# Patient Record
Sex: FEMALE | Race: WHITE | NOT HISPANIC OR LATINO | URBAN - METROPOLITAN AREA
[De-identification: names, ages, dates, MRNs, and addresses within clinical notes are randomized per-mention and may not be internally consistent; named-entity substitution may affect disease eponyms.]

---

## 2018-10-31 ENCOUNTER — TRANSCRIBE ORDERS (OUTPATIENT)
Dept: LAB | Facility: CLINIC | Age: 73
End: 2018-10-31

## 2018-10-31 ENCOUNTER — APPOINTMENT (OUTPATIENT)
Dept: LAB | Facility: CLINIC | Age: 73
End: 2018-10-31
Payer: MEDICARE

## 2018-10-31 DIAGNOSIS — Z11.59 SCREENING EXAMINATION FOR POLIOMYELITIS: ICD-10-CM

## 2018-10-31 DIAGNOSIS — E78.00 PURE HYPERCHOLESTEROLEMIA: Primary | ICD-10-CM

## 2018-10-31 DIAGNOSIS — E78.00 PURE HYPERCHOLESTEROLEMIA: ICD-10-CM

## 2018-10-31 LAB
ALBUMIN SERPL BCP-MCNC: 4.1 G/DL (ref 3.5–5)
ALP SERPL-CCNC: 102 U/L (ref 46–116)
ALT SERPL W P-5'-P-CCNC: 45 U/L (ref 12–78)
ANION GAP SERPL CALCULATED.3IONS-SCNC: 5 MMOL/L (ref 4–13)
AST SERPL W P-5'-P-CCNC: 38 U/L (ref 5–45)
BASOPHILS # BLD AUTO: 0.05 THOUSANDS/ΜL (ref 0–0.1)
BASOPHILS NFR BLD AUTO: 1 % (ref 0–1)
BILIRUB SERPL-MCNC: 0.78 MG/DL (ref 0.2–1)
BUN SERPL-MCNC: 13 MG/DL (ref 5–25)
CALCIUM SERPL-MCNC: 9.5 MG/DL (ref 8.3–10.1)
CHLORIDE SERPL-SCNC: 100 MMOL/L (ref 100–108)
CHOLEST SERPL-MCNC: 215 MG/DL (ref 50–200)
CO2 SERPL-SCNC: 29 MMOL/L (ref 21–32)
CREAT SERPL-MCNC: 1.02 MG/DL (ref 0.6–1.3)
EOSINOPHIL # BLD AUTO: 0.17 THOUSAND/ΜL (ref 0–0.61)
EOSINOPHIL NFR BLD AUTO: 3 % (ref 0–6)
ERYTHROCYTE [DISTWIDTH] IN BLOOD BY AUTOMATED COUNT: 12.5 % (ref 11.6–15.1)
GFR SERPL CREATININE-BSD FRML MDRD: 55 ML/MIN/1.73SQ M
GLUCOSE P FAST SERPL-MCNC: 80 MG/DL (ref 65–99)
HCT VFR BLD AUTO: 44 % (ref 34.8–46.1)
HDLC SERPL-MCNC: 60 MG/DL (ref 40–60)
HGB BLD-MCNC: 14.3 G/DL (ref 11.5–15.4)
IMM GRANULOCYTES # BLD AUTO: 0.02 THOUSAND/UL (ref 0–0.2)
IMM GRANULOCYTES NFR BLD AUTO: 0 % (ref 0–2)
LDLC SERPL CALC-MCNC: 134 MG/DL (ref 0–100)
LYMPHOCYTES # BLD AUTO: 2.18 THOUSANDS/ΜL (ref 0.6–4.47)
LYMPHOCYTES NFR BLD AUTO: 33 % (ref 14–44)
MCH RBC QN AUTO: 33.1 PG (ref 26.8–34.3)
MCHC RBC AUTO-ENTMCNC: 32.5 G/DL (ref 31.4–37.4)
MCV RBC AUTO: 102 FL (ref 82–98)
MONOCYTES # BLD AUTO: 0.7 THOUSAND/ΜL (ref 0.17–1.22)
MONOCYTES NFR BLD AUTO: 11 % (ref 4–12)
NEUTROPHILS # BLD AUTO: 3.43 THOUSANDS/ΜL (ref 1.85–7.62)
NEUTS SEG NFR BLD AUTO: 52 % (ref 43–75)
NONHDLC SERPL-MCNC: 155 MG/DL
NRBC BLD AUTO-RTO: 0 /100 WBCS
PLATELET # BLD AUTO: 299 THOUSANDS/UL (ref 149–390)
PMV BLD AUTO: 11.1 FL (ref 8.9–12.7)
POTASSIUM SERPL-SCNC: 4.4 MMOL/L (ref 3.5–5.3)
PROT SERPL-MCNC: 8 G/DL (ref 6.4–8.2)
RBC # BLD AUTO: 4.32 MILLION/UL (ref 3.81–5.12)
SODIUM SERPL-SCNC: 134 MMOL/L (ref 136–145)
TRIGL SERPL-MCNC: 104 MG/DL
TSH SERPL DL<=0.05 MIU/L-ACNC: 0.91 UIU/ML (ref 0.36–3.74)
WBC # BLD AUTO: 6.55 THOUSAND/UL (ref 4.31–10.16)

## 2018-10-31 PROCEDURE — 84443 ASSAY THYROID STIM HORMONE: CPT

## 2018-10-31 PROCEDURE — 85025 COMPLETE CBC W/AUTO DIFF WBC: CPT

## 2018-10-31 PROCEDURE — 36415 COLL VENOUS BLD VENIPUNCTURE: CPT

## 2018-10-31 PROCEDURE — 80053 COMPREHEN METABOLIC PANEL: CPT

## 2018-10-31 PROCEDURE — 86803 HEPATITIS C AB TEST: CPT

## 2018-10-31 PROCEDURE — 80061 LIPID PANEL: CPT

## 2018-11-01 LAB — HCV AB SER QL: NORMAL

## 2019-05-17 ENCOUNTER — APPOINTMENT (OUTPATIENT)
Dept: LAB | Facility: CLINIC | Age: 74
End: 2019-05-17
Payer: MEDICARE

## 2019-05-17 ENCOUNTER — TRANSCRIBE ORDERS (OUTPATIENT)
Dept: LAB | Facility: CLINIC | Age: 74
End: 2019-05-17

## 2019-05-17 DIAGNOSIS — E78.00 PURE HYPERCHOLESTEROLEMIA: Primary | ICD-10-CM

## 2019-05-17 LAB
ALBUMIN SERPL BCP-MCNC: 4.2 G/DL (ref 3.5–5)
ALP SERPL-CCNC: 103 U/L (ref 46–116)
ALT SERPL W P-5'-P-CCNC: 31 U/L (ref 12–78)
ANION GAP SERPL CALCULATED.3IONS-SCNC: 5 MMOL/L (ref 4–13)
AST SERPL W P-5'-P-CCNC: 25 U/L (ref 5–45)
BASOPHILS # BLD AUTO: 0.04 THOUSANDS/ΜL (ref 0–0.1)
BASOPHILS NFR BLD AUTO: 1 % (ref 0–1)
BILIRUB SERPL-MCNC: 0.87 MG/DL (ref 0.2–1)
BUN SERPL-MCNC: 12 MG/DL (ref 5–25)
CALCIUM SERPL-MCNC: 10.1 MG/DL (ref 8.3–10.1)
CHLORIDE SERPL-SCNC: 101 MMOL/L (ref 100–108)
CHOLEST SERPL-MCNC: 218 MG/DL (ref 50–200)
CO2 SERPL-SCNC: 29 MMOL/L (ref 21–32)
CREAT SERPL-MCNC: 1.01 MG/DL (ref 0.6–1.3)
EOSINOPHIL # BLD AUTO: 0.12 THOUSAND/ΜL (ref 0–0.61)
EOSINOPHIL NFR BLD AUTO: 1 % (ref 0–6)
ERYTHROCYTE [DISTWIDTH] IN BLOOD BY AUTOMATED COUNT: 13.1 % (ref 11.6–15.1)
GFR SERPL CREATININE-BSD FRML MDRD: 56 ML/MIN/1.73SQ M
GLUCOSE P FAST SERPL-MCNC: 85 MG/DL (ref 65–99)
HCT VFR BLD AUTO: 43 % (ref 34.8–46.1)
HDLC SERPL-MCNC: 71 MG/DL (ref 40–60)
HGB BLD-MCNC: 13.9 G/DL (ref 11.5–15.4)
IMM GRANULOCYTES # BLD AUTO: 0.02 THOUSAND/UL (ref 0–0.2)
IMM GRANULOCYTES NFR BLD AUTO: 0 % (ref 0–2)
LDLC SERPL CALC-MCNC: 133 MG/DL (ref 0–100)
LYMPHOCYTES # BLD AUTO: 1.67 THOUSANDS/ΜL (ref 0.6–4.47)
LYMPHOCYTES NFR BLD AUTO: 20 % (ref 14–44)
MCH RBC QN AUTO: 33.5 PG (ref 26.8–34.3)
MCHC RBC AUTO-ENTMCNC: 32.3 G/DL (ref 31.4–37.4)
MCV RBC AUTO: 104 FL (ref 82–98)
MONOCYTES # BLD AUTO: 0.65 THOUSAND/ΜL (ref 0.17–1.22)
MONOCYTES NFR BLD AUTO: 8 % (ref 4–12)
NEUTROPHILS # BLD AUTO: 5.95 THOUSANDS/ΜL (ref 1.85–7.62)
NEUTS SEG NFR BLD AUTO: 70 % (ref 43–75)
NONHDLC SERPL-MCNC: 147 MG/DL
NRBC BLD AUTO-RTO: 0 /100 WBCS
PLATELET # BLD AUTO: 287 THOUSANDS/UL (ref 149–390)
PMV BLD AUTO: 10.9 FL (ref 8.9–12.7)
POTASSIUM SERPL-SCNC: 4.3 MMOL/L (ref 3.5–5.3)
PROT SERPL-MCNC: 7.9 G/DL (ref 6.4–8.2)
RBC # BLD AUTO: 4.15 MILLION/UL (ref 3.81–5.12)
SODIUM SERPL-SCNC: 135 MMOL/L (ref 136–145)
TRIGL SERPL-MCNC: 71 MG/DL
TSH SERPL DL<=0.05 MIU/L-ACNC: 0.8 UIU/ML (ref 0.36–3.74)
WBC # BLD AUTO: 8.45 THOUSAND/UL (ref 4.31–10.16)

## 2019-05-17 PROCEDURE — 85025 COMPLETE CBC W/AUTO DIFF WBC: CPT

## 2019-05-17 PROCEDURE — 80053 COMPREHEN METABOLIC PANEL: CPT

## 2019-05-17 PROCEDURE — 84443 ASSAY THYROID STIM HORMONE: CPT

## 2019-05-17 PROCEDURE — 80061 LIPID PANEL: CPT

## 2019-05-17 PROCEDURE — 36415 COLL VENOUS BLD VENIPUNCTURE: CPT

## 2019-11-15 ENCOUNTER — TRANSCRIBE ORDERS (OUTPATIENT)
Dept: LAB | Facility: CLINIC | Age: 74
End: 2019-11-15

## 2019-11-15 ENCOUNTER — APPOINTMENT (OUTPATIENT)
Dept: LAB | Facility: CLINIC | Age: 74
End: 2019-11-15
Payer: MEDICARE

## 2019-11-15 DIAGNOSIS — I10 ESSENTIAL HYPERTENSION, MALIGNANT: Primary | ICD-10-CM

## 2019-11-15 DIAGNOSIS — I10 ESSENTIAL HYPERTENSION, MALIGNANT: ICD-10-CM

## 2019-11-15 LAB
ALBUMIN SERPL BCP-MCNC: 4.2 G/DL (ref 3.5–5)
ALP SERPL-CCNC: 101 U/L (ref 46–116)
ALT SERPL W P-5'-P-CCNC: 32 U/L (ref 12–78)
ANION GAP SERPL CALCULATED.3IONS-SCNC: 8 MMOL/L (ref 4–13)
AST SERPL W P-5'-P-CCNC: 22 U/L (ref 5–45)
BASOPHILS # BLD AUTO: 0.04 THOUSANDS/ΜL (ref 0–0.1)
BASOPHILS NFR BLD AUTO: 1 % (ref 0–1)
BILIRUB SERPL-MCNC: 0.8 MG/DL (ref 0.2–1)
BUN SERPL-MCNC: 11 MG/DL (ref 5–25)
CALCIUM SERPL-MCNC: 9.4 MG/DL (ref 8.3–10.1)
CHLORIDE SERPL-SCNC: 104 MMOL/L (ref 100–108)
CHOLEST SERPL-MCNC: 227 MG/DL (ref 50–200)
CO2 SERPL-SCNC: 26 MMOL/L (ref 21–32)
CREAT SERPL-MCNC: 0.96 MG/DL (ref 0.6–1.3)
EOSINOPHIL # BLD AUTO: 0.14 THOUSAND/ΜL (ref 0–0.61)
EOSINOPHIL NFR BLD AUTO: 2 % (ref 0–6)
ERYTHROCYTE [DISTWIDTH] IN BLOOD BY AUTOMATED COUNT: 13.2 % (ref 11.6–15.1)
GFR SERPL CREATININE-BSD FRML MDRD: 59 ML/MIN/1.73SQ M
GLUCOSE P FAST SERPL-MCNC: 81 MG/DL (ref 65–99)
HCT VFR BLD AUTO: 43.7 % (ref 34.8–46.1)
HDLC SERPL-MCNC: 71 MG/DL
HGB BLD-MCNC: 14 G/DL (ref 11.5–15.4)
IMM GRANULOCYTES # BLD AUTO: 0.01 THOUSAND/UL (ref 0–0.2)
IMM GRANULOCYTES NFR BLD AUTO: 0 % (ref 0–2)
LDLC SERPL CALC-MCNC: 139 MG/DL (ref 0–100)
LYMPHOCYTES # BLD AUTO: 2.02 THOUSANDS/ΜL (ref 0.6–4.47)
LYMPHOCYTES NFR BLD AUTO: 32 % (ref 14–44)
MCH RBC QN AUTO: 33.4 PG (ref 26.8–34.3)
MCHC RBC AUTO-ENTMCNC: 32 G/DL (ref 31.4–37.4)
MCV RBC AUTO: 104 FL (ref 82–98)
MONOCYTES # BLD AUTO: 0.58 THOUSAND/ΜL (ref 0.17–1.22)
MONOCYTES NFR BLD AUTO: 9 % (ref 4–12)
NEUTROPHILS # BLD AUTO: 3.49 THOUSANDS/ΜL (ref 1.85–7.62)
NEUTS SEG NFR BLD AUTO: 56 % (ref 43–75)
NONHDLC SERPL-MCNC: 156 MG/DL
NRBC BLD AUTO-RTO: 0 /100 WBCS
PLATELET # BLD AUTO: 274 THOUSANDS/UL (ref 149–390)
PMV BLD AUTO: 10.8 FL (ref 8.9–12.7)
POTASSIUM SERPL-SCNC: 4.3 MMOL/L (ref 3.5–5.3)
PROT SERPL-MCNC: 7.8 G/DL (ref 6.4–8.2)
RBC # BLD AUTO: 4.19 MILLION/UL (ref 3.81–5.12)
SODIUM SERPL-SCNC: 138 MMOL/L (ref 136–145)
TRIGL SERPL-MCNC: 86 MG/DL
WBC # BLD AUTO: 6.28 THOUSAND/UL (ref 4.31–10.16)

## 2019-11-15 PROCEDURE — 36415 COLL VENOUS BLD VENIPUNCTURE: CPT

## 2019-11-15 PROCEDURE — 80061 LIPID PANEL: CPT

## 2019-11-15 PROCEDURE — 80053 COMPREHEN METABOLIC PANEL: CPT

## 2019-11-15 PROCEDURE — 85025 COMPLETE CBC W/AUTO DIFF WBC: CPT

## 2020-10-21 ENCOUNTER — TRANSCRIBE ORDERS (OUTPATIENT)
Dept: LAB | Facility: CLINIC | Age: 75
End: 2020-10-21

## 2020-10-21 ENCOUNTER — LAB (OUTPATIENT)
Dept: LAB | Facility: CLINIC | Age: 75
End: 2020-10-21
Payer: MEDICARE

## 2020-10-21 DIAGNOSIS — I10 ESSENTIAL HYPERTENSION, MALIGNANT: Primary | ICD-10-CM

## 2020-10-21 DIAGNOSIS — I10 ESSENTIAL HYPERTENSION, MALIGNANT: ICD-10-CM

## 2020-10-21 LAB
ALBUMIN SERPL BCP-MCNC: 4.3 G/DL (ref 3.5–5)
ALP SERPL-CCNC: 111 U/L (ref 46–116)
ALT SERPL W P-5'-P-CCNC: 45 U/L (ref 12–78)
ANION GAP SERPL CALCULATED.3IONS-SCNC: 5 MMOL/L (ref 4–13)
AST SERPL W P-5'-P-CCNC: 29 U/L (ref 5–45)
BASOPHILS # BLD AUTO: 0.05 THOUSANDS/ΜL (ref 0–0.1)
BASOPHILS NFR BLD AUTO: 1 % (ref 0–1)
BILIRUB SERPL-MCNC: 0.76 MG/DL (ref 0.2–1)
BUN SERPL-MCNC: 16 MG/DL (ref 5–25)
CALCIUM SERPL-MCNC: 8.9 MG/DL (ref 8.3–10.1)
CHLORIDE SERPL-SCNC: 104 MMOL/L (ref 100–108)
CHOLEST SERPL-MCNC: 259 MG/DL (ref 50–200)
CO2 SERPL-SCNC: 29 MMOL/L (ref 21–32)
CREAT SERPL-MCNC: 0.88 MG/DL (ref 0.6–1.3)
EOSINOPHIL # BLD AUTO: 0.15 THOUSAND/ΜL (ref 0–0.61)
EOSINOPHIL NFR BLD AUTO: 2 % (ref 0–6)
ERYTHROCYTE [DISTWIDTH] IN BLOOD BY AUTOMATED COUNT: 14 % (ref 11.6–15.1)
GFR SERPL CREATININE-BSD FRML MDRD: 65 ML/MIN/1.73SQ M
GLUCOSE P FAST SERPL-MCNC: 87 MG/DL (ref 65–99)
HCT VFR BLD AUTO: 41.9 % (ref 34.8–46.1)
HDLC SERPL-MCNC: 73 MG/DL
HGB BLD-MCNC: 13 G/DL (ref 11.5–15.4)
IMM GRANULOCYTES # BLD AUTO: 0.02 THOUSAND/UL (ref 0–0.2)
IMM GRANULOCYTES NFR BLD AUTO: 0 % (ref 0–2)
LDLC SERPL CALC-MCNC: 168 MG/DL (ref 0–100)
LYMPHOCYTES # BLD AUTO: 1.66 THOUSANDS/ΜL (ref 0.6–4.47)
LYMPHOCYTES NFR BLD AUTO: 26 % (ref 14–44)
MCH RBC QN AUTO: 32.5 PG (ref 26.8–34.3)
MCHC RBC AUTO-ENTMCNC: 31 G/DL (ref 31.4–37.4)
MCV RBC AUTO: 105 FL (ref 82–98)
MONOCYTES # BLD AUTO: 0.65 THOUSAND/ΜL (ref 0.17–1.22)
MONOCYTES NFR BLD AUTO: 10 % (ref 4–12)
NEUTROPHILS # BLD AUTO: 3.98 THOUSANDS/ΜL (ref 1.85–7.62)
NEUTS SEG NFR BLD AUTO: 61 % (ref 43–75)
NONHDLC SERPL-MCNC: 186 MG/DL
NRBC BLD AUTO-RTO: 0 /100 WBCS
PLATELET # BLD AUTO: 298 THOUSANDS/UL (ref 149–390)
PMV BLD AUTO: 10.8 FL (ref 8.9–12.7)
POTASSIUM SERPL-SCNC: 4.5 MMOL/L (ref 3.5–5.3)
PROT SERPL-MCNC: 7.9 G/DL (ref 6.4–8.2)
RBC # BLD AUTO: 4 MILLION/UL (ref 3.81–5.12)
SODIUM SERPL-SCNC: 138 MMOL/L (ref 136–145)
TRIGL SERPL-MCNC: 89 MG/DL
WBC # BLD AUTO: 6.51 THOUSAND/UL (ref 4.31–10.16)

## 2020-10-21 PROCEDURE — 80061 LIPID PANEL: CPT

## 2020-10-21 PROCEDURE — 80053 COMPREHEN METABOLIC PANEL: CPT

## 2020-10-21 PROCEDURE — 85025 COMPLETE CBC W/AUTO DIFF WBC: CPT

## 2020-10-21 PROCEDURE — 36415 COLL VENOUS BLD VENIPUNCTURE: CPT

## 2021-04-06 ENCOUNTER — TRANSCRIBE ORDERS (OUTPATIENT)
Dept: LAB | Facility: CLINIC | Age: 76
End: 2021-04-06

## 2021-04-06 ENCOUNTER — APPOINTMENT (OUTPATIENT)
Dept: LAB | Facility: CLINIC | Age: 76
End: 2021-04-06
Payer: MEDICARE

## 2021-04-06 DIAGNOSIS — I10 ESSENTIAL HYPERTENSION, MALIGNANT: ICD-10-CM

## 2021-04-06 DIAGNOSIS — E78.2 MIXED HYPERLIPIDEMIA: ICD-10-CM

## 2021-04-06 DIAGNOSIS — E78.2 MIXED HYPERLIPIDEMIA: Primary | ICD-10-CM

## 2021-04-06 LAB
ALBUMIN SERPL BCP-MCNC: 4.2 G/DL (ref 3.5–5)
ALP SERPL-CCNC: 104 U/L (ref 46–116)
ALT SERPL W P-5'-P-CCNC: 45 U/L (ref 12–78)
ANION GAP SERPL CALCULATED.3IONS-SCNC: 3 MMOL/L (ref 4–13)
AST SERPL W P-5'-P-CCNC: 30 U/L (ref 5–45)
BILIRUB SERPL-MCNC: 0.74 MG/DL (ref 0.2–1)
BUN SERPL-MCNC: 12 MG/DL (ref 5–25)
CALCIUM SERPL-MCNC: 9.2 MG/DL (ref 8.3–10.1)
CHLORIDE SERPL-SCNC: 108 MMOL/L (ref 100–108)
CHOLEST SERPL-MCNC: 173 MG/DL (ref 50–200)
CO2 SERPL-SCNC: 28 MMOL/L (ref 21–32)
CREAT SERPL-MCNC: 0.96 MG/DL (ref 0.6–1.3)
GFR SERPL CREATININE-BSD FRML MDRD: 59 ML/MIN/1.73SQ M
GLUCOSE P FAST SERPL-MCNC: 91 MG/DL (ref 65–99)
HDLC SERPL-MCNC: 92 MG/DL
LDLC SERPL CALC-MCNC: 70 MG/DL (ref 0–100)
NONHDLC SERPL-MCNC: 81 MG/DL
POTASSIUM SERPL-SCNC: 4.2 MMOL/L (ref 3.5–5.3)
PROT SERPL-MCNC: 7.9 G/DL (ref 6.4–8.2)
SODIUM SERPL-SCNC: 139 MMOL/L (ref 136–145)
TRIGL SERPL-MCNC: 57 MG/DL

## 2021-04-06 PROCEDURE — 80061 LIPID PANEL: CPT

## 2021-04-06 PROCEDURE — 80053 COMPREHEN METABOLIC PANEL: CPT

## 2021-04-06 PROCEDURE — 36415 COLL VENOUS BLD VENIPUNCTURE: CPT

## 2022-03-01 ENCOUNTER — APPOINTMENT (OUTPATIENT)
Dept: LAB | Facility: CLINIC | Age: 77
End: 2022-03-01
Payer: MEDICARE

## 2022-03-01 DIAGNOSIS — E78.2 MIXED HYPERLIPIDEMIA: ICD-10-CM

## 2022-03-01 DIAGNOSIS — I10 ESSENTIAL HYPERTENSION, MALIGNANT: ICD-10-CM

## 2022-03-01 LAB
ALBUMIN SERPL BCP-MCNC: 4.3 G/DL (ref 3.5–5)
ALP SERPL-CCNC: 99 U/L (ref 46–116)
ALT SERPL W P-5'-P-CCNC: 41 U/L (ref 12–78)
ANION GAP SERPL CALCULATED.3IONS-SCNC: 8 MMOL/L (ref 4–13)
AST SERPL W P-5'-P-CCNC: 29 U/L (ref 5–45)
BASOPHILS # BLD AUTO: 0.05 THOUSANDS/ΜL (ref 0–0.1)
BASOPHILS NFR BLD AUTO: 1 % (ref 0–1)
BILIRUB SERPL-MCNC: 0.59 MG/DL (ref 0.2–1)
BUN SERPL-MCNC: 17 MG/DL (ref 5–25)
CALCIUM SERPL-MCNC: 9.2 MG/DL (ref 8.3–10.1)
CHLORIDE SERPL-SCNC: 105 MMOL/L (ref 100–108)
CHOLEST SERPL-MCNC: 184 MG/DL
CO2 SERPL-SCNC: 23 MMOL/L (ref 21–32)
CREAT SERPL-MCNC: 0.98 MG/DL (ref 0.6–1.3)
EOSINOPHIL # BLD AUTO: 0.19 THOUSAND/ΜL (ref 0–0.61)
EOSINOPHIL NFR BLD AUTO: 3 % (ref 0–6)
ERYTHROCYTE [DISTWIDTH] IN BLOOD BY AUTOMATED COUNT: 13.1 % (ref 11.6–15.1)
GFR SERPL CREATININE-BSD FRML MDRD: 57 ML/MIN/1.73SQ M
GLUCOSE P FAST SERPL-MCNC: 89 MG/DL (ref 65–99)
HCT VFR BLD AUTO: 43.5 % (ref 34.8–46.1)
HDLC SERPL-MCNC: 80 MG/DL
HGB BLD-MCNC: 13.8 G/DL (ref 11.5–15.4)
IMM GRANULOCYTES # BLD AUTO: 0.02 THOUSAND/UL (ref 0–0.2)
IMM GRANULOCYTES NFR BLD AUTO: 0 % (ref 0–2)
LDLC SERPL CALC-MCNC: 91 MG/DL (ref 0–100)
LYMPHOCYTES # BLD AUTO: 1.91 THOUSANDS/ΜL (ref 0.6–4.47)
LYMPHOCYTES NFR BLD AUTO: 33 % (ref 14–44)
MCH RBC QN AUTO: 32.5 PG (ref 26.8–34.3)
MCHC RBC AUTO-ENTMCNC: 31.7 G/DL (ref 31.4–37.4)
MCV RBC AUTO: 103 FL (ref 82–98)
MONOCYTES # BLD AUTO: 0.62 THOUSAND/ΜL (ref 0.17–1.22)
MONOCYTES NFR BLD AUTO: 11 % (ref 4–12)
NEUTROPHILS # BLD AUTO: 2.93 THOUSANDS/ΜL (ref 1.85–7.62)
NEUTS SEG NFR BLD AUTO: 52 % (ref 43–75)
NONHDLC SERPL-MCNC: 104 MG/DL
NRBC BLD AUTO-RTO: 0 /100 WBCS
PLATELET # BLD AUTO: 261 THOUSANDS/UL (ref 149–390)
PMV BLD AUTO: 10.4 FL (ref 8.9–12.7)
POTASSIUM SERPL-SCNC: 4.7 MMOL/L (ref 3.5–5.3)
PROT SERPL-MCNC: 8.2 G/DL (ref 6.4–8.2)
RBC # BLD AUTO: 4.24 MILLION/UL (ref 3.81–5.12)
SODIUM SERPL-SCNC: 136 MMOL/L (ref 136–145)
TRIGL SERPL-MCNC: 63 MG/DL
WBC # BLD AUTO: 5.72 THOUSAND/UL (ref 4.31–10.16)

## 2022-03-01 PROCEDURE — 80061 LIPID PANEL: CPT

## 2022-03-01 PROCEDURE — 85025 COMPLETE CBC W/AUTO DIFF WBC: CPT

## 2022-03-01 PROCEDURE — 36415 COLL VENOUS BLD VENIPUNCTURE: CPT

## 2022-03-01 PROCEDURE — 80053 COMPREHEN METABOLIC PANEL: CPT

## 2022-09-20 ENCOUNTER — APPOINTMENT (OUTPATIENT)
Dept: LAB | Facility: CLINIC | Age: 77
End: 2022-09-20
Payer: MEDICARE

## 2022-09-20 DIAGNOSIS — E55.9 AVITAMINOSIS D: ICD-10-CM

## 2022-09-20 DIAGNOSIS — E78.2 MIXED HYPERLIPIDEMIA: ICD-10-CM

## 2022-09-20 LAB
25(OH)D3 SERPL-MCNC: 21.7 NG/ML (ref 30–100)
ALBUMIN SERPL BCP-MCNC: 3.8 G/DL (ref 3.5–5)
ALP SERPL-CCNC: 100 U/L (ref 46–116)
ALT SERPL W P-5'-P-CCNC: 36 U/L (ref 12–78)
ANION GAP SERPL CALCULATED.3IONS-SCNC: 4 MMOL/L (ref 4–13)
AST SERPL W P-5'-P-CCNC: 37 U/L (ref 5–45)
BILIRUB SERPL-MCNC: 0.77 MG/DL (ref 0.2–1)
BUN SERPL-MCNC: 13 MG/DL (ref 5–25)
CALCIUM SERPL-MCNC: 9.5 MG/DL (ref 8.3–10.1)
CHLORIDE SERPL-SCNC: 105 MMOL/L (ref 96–108)
CHOLEST SERPL-MCNC: 159 MG/DL
CO2 SERPL-SCNC: 28 MMOL/L (ref 21–32)
CREAT SERPL-MCNC: 1.04 MG/DL (ref 0.6–1.3)
GFR SERPL CREATININE-BSD FRML MDRD: 52 ML/MIN/1.73SQ M
GLUCOSE P FAST SERPL-MCNC: 99 MG/DL (ref 65–99)
HDLC SERPL-MCNC: 66 MG/DL
LDLC SERPL CALC-MCNC: 81 MG/DL (ref 0–100)
NONHDLC SERPL-MCNC: 93 MG/DL
POTASSIUM SERPL-SCNC: 5 MMOL/L (ref 3.5–5.3)
PROT SERPL-MCNC: 7.8 G/DL (ref 6.4–8.4)
SODIUM SERPL-SCNC: 137 MMOL/L (ref 135–147)
TRIGL SERPL-MCNC: 59 MG/DL

## 2022-09-20 PROCEDURE — 82306 VITAMIN D 25 HYDROXY: CPT

## 2022-09-20 PROCEDURE — 36415 COLL VENOUS BLD VENIPUNCTURE: CPT

## 2022-09-20 PROCEDURE — 80061 LIPID PANEL: CPT

## 2022-09-20 PROCEDURE — 80053 COMPREHEN METABOLIC PANEL: CPT

## 2024-02-15 ENCOUNTER — APPOINTMENT (OUTPATIENT)
Dept: LAB | Facility: CLINIC | Age: 79
End: 2024-02-15
Payer: MEDICARE

## 2024-02-15 DIAGNOSIS — I10 ESSENTIAL HYPERTENSION: ICD-10-CM

## 2024-02-15 DIAGNOSIS — E78.5 HYPERLIPIDEMIA, UNSPECIFIED HYPERLIPIDEMIA TYPE: ICD-10-CM

## 2024-02-15 LAB
ALBUMIN SERPL BCP-MCNC: 4.6 G/DL (ref 3.5–5)
ALP SERPL-CCNC: 84 U/L (ref 34–104)
ALT SERPL W P-5'-P-CCNC: 41 U/L (ref 7–52)
ANION GAP SERPL CALCULATED.3IONS-SCNC: 10 MMOL/L
AST SERPL W P-5'-P-CCNC: 46 U/L (ref 13–39)
BILIRUB SERPL-MCNC: 0.87 MG/DL (ref 0.2–1)
BUN SERPL-MCNC: 13 MG/DL (ref 5–25)
CALCIUM SERPL-MCNC: 9.2 MG/DL (ref 8.4–10.2)
CHLORIDE SERPL-SCNC: 102 MMOL/L (ref 96–108)
CHOLEST SERPL-MCNC: 153 MG/DL
CO2 SERPL-SCNC: 27 MMOL/L (ref 21–32)
CREAT SERPL-MCNC: 0.86 MG/DL (ref 0.6–1.3)
ERYTHROCYTE [DISTWIDTH] IN BLOOD BY AUTOMATED COUNT: 12.9 % (ref 11.6–15.1)
GFR SERPL CREATININE-BSD FRML MDRD: 65 ML/MIN/1.73SQ M
GLUCOSE P FAST SERPL-MCNC: 98 MG/DL (ref 65–99)
HCT VFR BLD AUTO: 42.4 % (ref 34.8–46.1)
HDLC SERPL-MCNC: 60 MG/DL
HGB BLD-MCNC: 13.8 G/DL (ref 11.5–15.4)
LDLC SERPL CALC-MCNC: 73 MG/DL (ref 0–100)
MCH RBC QN AUTO: 34.1 PG (ref 26.8–34.3)
MCHC RBC AUTO-ENTMCNC: 32.5 G/DL (ref 31.4–37.4)
MCV RBC AUTO: 105 FL (ref 82–98)
NONHDLC SERPL-MCNC: 93 MG/DL
PLATELET # BLD AUTO: 229 THOUSANDS/UL (ref 149–390)
PMV BLD AUTO: 11.2 FL (ref 8.9–12.7)
POTASSIUM SERPL-SCNC: 4.2 MMOL/L (ref 3.5–5.3)
PROT SERPL-MCNC: 7.3 G/DL (ref 6.4–8.4)
RBC # BLD AUTO: 4.05 MILLION/UL (ref 3.81–5.12)
SODIUM SERPL-SCNC: 139 MMOL/L (ref 135–147)
TRIGL SERPL-MCNC: 101 MG/DL
WBC # BLD AUTO: 7.24 THOUSAND/UL (ref 4.31–10.16)

## 2024-02-15 PROCEDURE — 36415 COLL VENOUS BLD VENIPUNCTURE: CPT

## 2024-02-15 PROCEDURE — 80061 LIPID PANEL: CPT

## 2024-02-15 PROCEDURE — 80053 COMPREHEN METABOLIC PANEL: CPT

## 2024-02-15 PROCEDURE — 85027 COMPLETE CBC AUTOMATED: CPT

## 2024-06-04 ENCOUNTER — APPOINTMENT (OUTPATIENT)
Dept: LAB | Facility: CLINIC | Age: 79
End: 2024-06-04
Payer: MEDICARE

## 2024-06-04 DIAGNOSIS — R74.8 ACID PHOSPHATASE ELEVATED: ICD-10-CM

## 2024-06-04 LAB
ALBUMIN SERPL BCP-MCNC: 4.3 G/DL (ref 3.5–5)
ALP SERPL-CCNC: 73 U/L (ref 34–104)
ALT SERPL W P-5'-P-CCNC: 16 U/L (ref 7–52)
AST SERPL W P-5'-P-CCNC: 21 U/L (ref 13–39)
BILIRUB DIRECT SERPL-MCNC: 0.18 MG/DL (ref 0–0.2)
BILIRUB SERPL-MCNC: 1.02 MG/DL (ref 0.2–1)
PROT SERPL-MCNC: 6.8 G/DL (ref 6.4–8.4)

## 2024-06-04 PROCEDURE — 36415 COLL VENOUS BLD VENIPUNCTURE: CPT

## 2024-06-04 PROCEDURE — 80076 HEPATIC FUNCTION PANEL: CPT

## 2024-09-16 ENCOUNTER — APPOINTMENT (OUTPATIENT)
Dept: LAB | Facility: CLINIC | Age: 79
End: 2024-09-16
Payer: MEDICARE

## 2024-09-16 DIAGNOSIS — E78.2 MIXED HYPERLIPIDEMIA: ICD-10-CM

## 2024-09-16 DIAGNOSIS — I10 ESSENTIAL HYPERTENSION, MALIGNANT: ICD-10-CM

## 2024-09-16 LAB
ALBUMIN SERPL BCG-MCNC: 4.3 G/DL (ref 3.5–5)
ALP SERPL-CCNC: 107 U/L (ref 34–104)
ALT SERPL W P-5'-P-CCNC: 24 U/L (ref 7–52)
ANION GAP SERPL CALCULATED.3IONS-SCNC: 10 MMOL/L (ref 4–13)
AST SERPL W P-5'-P-CCNC: 24 U/L (ref 13–39)
BILIRUB SERPL-MCNC: 0.87 MG/DL (ref 0.2–1)
BUN SERPL-MCNC: 13 MG/DL (ref 5–25)
CALCIUM SERPL-MCNC: 9.4 MG/DL (ref 8.4–10.2)
CHLORIDE SERPL-SCNC: 103 MMOL/L (ref 96–108)
CHOLEST SERPL-MCNC: 158 MG/DL
CO2 SERPL-SCNC: 27 MMOL/L (ref 21–32)
CREAT SERPL-MCNC: 0.73 MG/DL (ref 0.6–1.3)
ERYTHROCYTE [DISTWIDTH] IN BLOOD BY AUTOMATED COUNT: 13.7 % (ref 11.6–15.1)
GFR SERPL CREATININE-BSD FRML MDRD: 79 ML/MIN/1.73SQ M
GLUCOSE P FAST SERPL-MCNC: 90 MG/DL (ref 65–99)
HCT VFR BLD AUTO: 41.1 % (ref 34.8–46.1)
HDLC SERPL-MCNC: 70 MG/DL
HGB BLD-MCNC: 13.1 G/DL (ref 11.5–15.4)
LDLC SERPL CALC-MCNC: 73 MG/DL (ref 0–100)
MCH RBC QN AUTO: 33.7 PG (ref 26.8–34.3)
MCHC RBC AUTO-ENTMCNC: 31.9 G/DL (ref 31.4–37.4)
MCV RBC AUTO: 106 FL (ref 82–98)
NONHDLC SERPL-MCNC: 88 MG/DL
PLATELET # BLD AUTO: 253 THOUSANDS/UL (ref 149–390)
PMV BLD AUTO: 11 FL (ref 8.9–12.7)
POTASSIUM SERPL-SCNC: 4.1 MMOL/L (ref 3.5–5.3)
PROT SERPL-MCNC: 7.3 G/DL (ref 6.4–8.4)
RBC # BLD AUTO: 3.89 MILLION/UL (ref 3.81–5.12)
SODIUM SERPL-SCNC: 140 MMOL/L (ref 135–147)
TRIGL SERPL-MCNC: 73 MG/DL
WBC # BLD AUTO: 6.36 THOUSAND/UL (ref 4.31–10.16)

## 2024-09-16 PROCEDURE — 85027 COMPLETE CBC AUTOMATED: CPT

## 2024-09-16 PROCEDURE — 80053 COMPREHEN METABOLIC PANEL: CPT

## 2024-09-16 PROCEDURE — 80061 LIPID PANEL: CPT

## 2024-09-16 PROCEDURE — 36415 COLL VENOUS BLD VENIPUNCTURE: CPT

## 2025-03-20 ENCOUNTER — APPOINTMENT (OUTPATIENT)
Dept: LAB | Facility: CLINIC | Age: 80
End: 2025-03-20
Payer: COMMERCIAL

## 2025-03-20 DIAGNOSIS — E78.2 MIXED HYPERLIPIDEMIA: ICD-10-CM

## 2025-03-20 DIAGNOSIS — I10 ESSENTIAL HYPERTENSION, MALIGNANT: ICD-10-CM

## 2025-03-20 LAB
ALBUMIN SERPL BCG-MCNC: 4.5 G/DL (ref 3.5–5)
ALP SERPL-CCNC: 95 U/L (ref 34–104)
ALT SERPL W P-5'-P-CCNC: 17 U/L (ref 7–52)
ANION GAP SERPL CALCULATED.3IONS-SCNC: 9 MMOL/L (ref 4–13)
AST SERPL W P-5'-P-CCNC: 22 U/L (ref 13–39)
BASOPHILS # BLD AUTO: 0.04 THOUSANDS/ÂΜL (ref 0–0.1)
BASOPHILS NFR BLD AUTO: 1 % (ref 0–1)
BILIRUB SERPL-MCNC: 0.74 MG/DL (ref 0.2–1)
BUN SERPL-MCNC: 11 MG/DL (ref 5–25)
CALCIUM SERPL-MCNC: 9.6 MG/DL (ref 8.4–10.2)
CHLORIDE SERPL-SCNC: 103 MMOL/L (ref 96–108)
CHOLEST SERPL-MCNC: 151 MG/DL (ref ?–200)
CO2 SERPL-SCNC: 28 MMOL/L (ref 21–32)
CREAT SERPL-MCNC: 0.85 MG/DL (ref 0.6–1.3)
EOSINOPHIL # BLD AUTO: 0.14 THOUSAND/ÂΜL (ref 0–0.61)
EOSINOPHIL NFR BLD AUTO: 2 % (ref 0–6)
ERYTHROCYTE [DISTWIDTH] IN BLOOD BY AUTOMATED COUNT: 13.2 % (ref 11.6–15.1)
GFR SERPL CREATININE-BSD FRML MDRD: 66 ML/MIN/1.73SQ M
GLUCOSE P FAST SERPL-MCNC: 85 MG/DL (ref 65–99)
HCT VFR BLD AUTO: 42.2 % (ref 34.8–46.1)
HDLC SERPL-MCNC: 69 MG/DL
HGB BLD-MCNC: 13.7 G/DL (ref 11.5–15.4)
IMM GRANULOCYTES # BLD AUTO: 0.01 THOUSAND/UL (ref 0–0.2)
IMM GRANULOCYTES NFR BLD AUTO: 0 % (ref 0–2)
LDLC SERPL CALC-MCNC: 68 MG/DL (ref 0–100)
LYMPHOCYTES # BLD AUTO: 2.17 THOUSANDS/ÂΜL (ref 0.6–4.47)
LYMPHOCYTES NFR BLD AUTO: 37 % (ref 14–44)
MCH RBC QN AUTO: 34 PG (ref 26.8–34.3)
MCHC RBC AUTO-ENTMCNC: 32.5 G/DL (ref 31.4–37.4)
MCV RBC AUTO: 105 FL (ref 82–98)
MONOCYTES # BLD AUTO: 0.66 THOUSAND/ÂΜL (ref 0.17–1.22)
MONOCYTES NFR BLD AUTO: 11 % (ref 4–12)
NEUTROPHILS # BLD AUTO: 2.81 THOUSANDS/ÂΜL (ref 1.85–7.62)
NEUTS SEG NFR BLD AUTO: 49 % (ref 43–75)
NONHDLC SERPL-MCNC: 82 MG/DL
NRBC BLD AUTO-RTO: 0 /100 WBCS
PLATELET # BLD AUTO: 288 THOUSANDS/UL (ref 149–390)
PMV BLD AUTO: 10.3 FL (ref 8.9–12.7)
POTASSIUM SERPL-SCNC: 4.2 MMOL/L (ref 3.5–5.3)
PROT SERPL-MCNC: 7.2 G/DL (ref 6.4–8.4)
RBC # BLD AUTO: 4.03 MILLION/UL (ref 3.81–5.12)
SODIUM SERPL-SCNC: 140 MMOL/L (ref 135–147)
TRIGL SERPL-MCNC: 68 MG/DL (ref ?–150)
WBC # BLD AUTO: 5.83 THOUSAND/UL (ref 4.31–10.16)

## 2025-03-20 PROCEDURE — 80053 COMPREHEN METABOLIC PANEL: CPT

## 2025-03-20 PROCEDURE — 85025 COMPLETE CBC W/AUTO DIFF WBC: CPT

## 2025-03-20 PROCEDURE — 80061 LIPID PANEL: CPT

## 2025-03-20 PROCEDURE — 36415 COLL VENOUS BLD VENIPUNCTURE: CPT

## 2025-05-05 ENCOUNTER — EVALUATION (OUTPATIENT)
Dept: OCCUPATIONAL THERAPY | Facility: CLINIC | Age: 80
End: 2025-05-05
Payer: COMMERCIAL

## 2025-05-05 DIAGNOSIS — S52.502A CLOSED FRACTURE OF DISTAL END OF LEFT RADIUS, UNSPECIFIED FRACTURE MORPHOLOGY, INITIAL ENCOUNTER: Primary | ICD-10-CM

## 2025-05-05 PROCEDURE — 97165 OT EVAL LOW COMPLEX 30 MIN: CPT

## 2025-05-05 PROCEDURE — 97110 THERAPEUTIC EXERCISES: CPT

## 2025-05-05 NOTE — PROGRESS NOTES
OT Evaluation     Today's date: 2025  Patient name: Larissa Powell  : 1945  MRN: [id number]  Referring provider: No ref. provider found  Dx:   Encounter Diagnosis     ICD-10-CM    1. Closed fracture of distal end of left radius, unspecified fracture morphology, initial encounter  S52.502A           Start Time: 1400  Stop Time: 1440  Total time in clinic (min): 40 minutes    Assessment  Impairments: abnormal coordination, abnormal or restricted ROM, abnormal movement, activity intolerance, impaired physical strength, lacks appropriate home exercise program, pain with function, weight-bearing intolerance, unable to perform ADL, activity limitations and endurance  Symptom irritability: moderate    Assessment details: Larissa Powell is a 79 y.o., Right HD female referred to hand therapy for a L distal radius ORIF.  Onset of injury 25 due to a FOOSH while doing yard work.  Patient presents 27 with impaired ROM, strength, and coordination of the L hand and wrist.  Deficits also noted in pain and functional use of the left UE. Patient has an incision/wound on the volar wrist and FA that is healing well with no signs or symptoms of infection. She was provided with an initial HEP focused on AROM of wrist, thumb, digits, and FA as well as passive stretching of digits into composite flexionPatient is a good candidate for OT services to decrease pain and edema and restore ROM, strength, coordination, and function for a return to independence in daily tasks.   Understanding of Dx/Px/POC: excellent     Prognosis: good    Goals  STGs (4 weeks)  Patient will be independent in implementing HEP prescribed by therapist  Patient will demonstrate 5-10 degree improvement in WALKER of the digits  Patient will demonstrate active wrist extension/flexion to 40/40  Promote proper scar healing to prevent scar adhesion and infection  Decrease edema by 50% at wrist as evident by circumferential measurements.  LTGs (10 weeks)  Patient  will demonstrate independence in a HEP to maintain ROM, strength, and function at discharge  Patient will demonstrate  strength within 20% of R hand for improved use of L hand in ADLs  Patient will demonstrate WALKER of the digits to WFLs degrees to be independent in self care tasks  Patient will demonstrate AROM of the wrist and forearm WFL to be independent in self care tasks  Patient will demonstrate 5/5 muscle strength in the wrist and forearm to be MI for meal prep  Patient will achieve goals as demonstrated by FOTO results  Patient will demonstrate 90% resolution of edema     Plan  Patient would benefit from: skilled occupational therapy and home program  Planned modality interventions: thermotherapy: hydrocollator packs and cryotherapy    Planned therapy interventions: IASTM, kinesiology taping, joint mobilization, manual therapy, massage, neuromuscular re-education, strengthening, stretching, therapeutic activities, therapeutic exercise, home exercise program, graded exercise, graded activity, functional ROM exercises, flexibility, fine motor coordination training, coordination, compression and activity modification    Frequency: 2x week  Duration in weeks: 10  Plan of Care beginning date: 5/5/2025  Plan of Care expiration date: 7/14/2025  Treatment plan discussed with: patient        Subjective Evaluation    History of Present Illness  Date of onset: 4/1/2025  Date of surgery: 4/10/2025  Mechanism of injury: surgery and trauma  Mechanism of injury: Larissa Powell is a 79 y.o. female referred to OT s/p L distal radius ORIF on 4/10/25. Patient reports that she was doing yard work on 4/1/25. She presented to [facility] where she was placed in a cast. She followed up with orthopedics and she elected for surgery due to nature and displacement of fracture.   She presents today with no pain and reports some discomfort and sensitivity around her scar. She has been compliant with brace.  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     Additional Observation Details  Incision is well healed, non-tender to touch or palpation    Tenderness     Right Wrist/Hand   No tenderness in the first dorsal compartment, fourth dorsal compartment, CMC joint, TFCC and radial styloid process.     Additional Tenderness Details  No tenderness throughout wrist    Neurological Testing     Additional Neurological Details  Patient denies N/T in digits    Active Range of Motion     Left Elbow   Forearm supination: 65 degrees   Forearm pronation: 79 degrees     Right Elbow   Forearm supination: 69 degrees   Forearm pronation: 79 degrees     Left Wrist   Wrist flexion: 35 (23 with digits flexed) degrees   Wrist extension: 30 (28 with digits extended) degrees   Radial deviation: 16 degrees   Ulnar deviation: 15 degrees     Right Wrist   Wrist flexion: 69 degrees   Wrist extension: 71 degrees   Radial deviation: 36 degrees   Ulnar deviation: 18 degrees     Left Thumb   Flexion     MP: 50 degrees    DIP: 50 degrees  Palmar Abduction     CMC: 36 degrees  Radial abduction    CMC: 32 degrees  Kapandji score: 9 degrees      Right Thumb   Flexion     MP: 60    DIP: 70  Palmar Abduction    CMC: 39  Radial Abduction    CMC: 36    Left Digits    Flexion   Index     MCP: 75    PIP: 90    DIP: 55  Middle     MCP: 85    PIP: 95    DIP: 55  Ring     MCP: 85    PIP: 95    DIP: 55  Little     MCP: 85    PIP: 90    DIP: 70  Index: 220 degrees  Middle: 235 degrees  Ring: " "235 degrees  Small: 245 degrees    Tests     Left Wrist/Hand   Positive extrinsic extensor tightness and oblique retinacular ligament tightness.   Negative Bunnel-Littler, extrinsic flexor tightness and intrinsic muscle tightness.     Additional Tests Details  Mild ORL tightness LMF    Swelling     Left Wrist/Hand   Circumference wrist: 17 cm    Right Wrist/Hand   Circumference wrist: 15.6 cm             Precautions: L distal radius ORIF DOS 4/10/25  Access Code: [email or web address]  POC expires Unit limit Auth  expiration date PT/OT + Visit Limit?   7/14/25  pending BOMN                 Visit/Unit Tracking  AUTH Status:  Date 5/5 IE              pending Used 1               Remaining                     Manuals 5/5 IE        Scar massage Demo for HEP        Edema control                           Neuro Re-Ed                                                                        Ther Ex         Wrist A/AAROM Ext/flex x20  RD/UD x20        FA A/AAROM P/S x20        TGEs x10        Thumb AROM Opp x20        Digit A/AAROM Comp flexion self PROM 10 x 5\"                                   Ther Activity         Towel scrunch         FMC, translation         HEP                           Modalities         MHP 5'                         "

## 2025-05-05 NOTE — LETTER
May 5, 2025    Lazaro Paez PA-C  [address] [facility] [address]    Patient: Larissa Powell   YOB: 1945   Date of Visit: 2025     Encounter Diagnosis     ICD-10-CM    1. Closed fracture of distal end of left radius, unspecified fracture morphology, initial encounter  S52.502A           Dear Lazaro Paez PA-C:    Thank you for your recent referral of Larissa Powell. Please review the attached evaluation summary from Iris's recent visit.     Please verify that you agree with the plan of care by signing the attached order.     If you have any questions or concerns, please do not hesitate to call.     I sincerely appreciate the opportunity to share in the care of one of your patients and hope to have another opportunity to work with you in the near future.     Sincerely,    Jhoan Stearns, OT      Referring Provider:     I certify that I have read the below Plan of Care and certify the need for these services furnished under this plan of treatment while under my care.                    Lazaro Paez PA-C  [address] [facility] [address]  Via Fax: [phone]        OT Evaluation     Today's date: 2025  Patient name: Larissa Powell  : 1945  MRN: [id number]  Referring provider: No ref. provider found  Dx:   Encounter Diagnosis     ICD-10-CM    1. Closed fracture of distal end of left radius, unspecified fracture morphology, initial encounter  S52.502A           Start Time: 1400  Stop Time: 1440  Total time in clinic (min): 40 minutes    Assessment  Impairments: abnormal coordination, abnormal or restricted ROM, abnormal movement, activity intolerance, impaired physical strength, lacks appropriate home exercise program, pain with function, weight-bearing intolerance, unable to perform ADL, activity limitations and endurance  Symptom irritability: moderate    Assessment details: Larissa Powell is a 79 y.o., Right HD female referred to hand therapy for a L distal  radius ORIF.  Onset of injury 4/1/25 due to a FOOSH while doing yard work.  Patient presents 05/05/25 with impaired ROM, strength, and coordination of the L hand and wrist.  Deficits also noted in pain and functional use of the left UE. Patient has an incision/wound on the volar wrist and FA that is healing well with no signs or symptoms of infection. She was provided with an initial HEP focused on AROM of wrist, thumb, digits, and FA as well as passive stretching of digits into composite flexionPatient is a good candidate for OT services to decrease pain and edema and restore ROM, strength, coordination, and function for a return to independence in daily tasks.   Understanding of Dx/Px/POC: excellent     Prognosis: good    Goals  STGs (4 weeks)  Patient will be independent in implementing HEP prescribed by therapist  Patient will demonstrate 5-10 degree improvement in WALKER of the digits  Patient will demonstrate active wrist extension/flexion to 40/40  Promote proper scar healing to prevent scar adhesion and infection  Decrease edema by 50% at wrist as evident by circumferential measurements.  LTGs (10 weeks)  Patient will demonstrate independence in a HEP to maintain ROM, strength, and function at discharge  Patient will demonstrate  strength within 20% of R hand for improved use of L hand in ADLs  Patient will demonstrate WALKER of the digits to WFLs degrees to be independent in self care tasks  Patient will demonstrate AROM of the wrist and forearm WFL to be independent in self care tasks  Patient will demonstrate 5/5 muscle strength in the wrist and forearm to be MI for meal prep  Patient will achieve goals as demonstrated by FOTO results  Patient will demonstrate 90% resolution of edema     Plan  Patient would benefit from: skilled occupational therapy and home program  Planned modality interventions: thermotherapy: hydrocollator packs and cryotherapy    Planned therapy interventions: IASTM, kinesiology  "taping, joint mobilization, manual therapy, massage, neuromuscular re-education, strengthening, stretching, therapeutic activities, therapeutic exercise, home exercise program, graded exercise, graded activity, functional ROM exercises, flexibility, fine motor coordination training, coordination, compression and activity modification    Frequency: 2x week  Duration in weeks: 10  Plan of Care beginning date: 2025  Plan of Care expiration date: 2025  Treatment plan discussed with: patient        Subjective Evaluation    History of Present Illness  Date of onset: 2025  Date of surgery: 4/10/2025  Mechanism of injury: surgery and trauma  Mechanism of injury: Larissa Powell is a 79 y.o. female referred to OT s/p L distal radius ORIF on 4/10/25. Patient reports that she was doing yard work on 25. She presented to [facility] where she was placed in a cast. She followed up with orthopedics and she elected for surgery due to nature and displacement of fracture.   She presents today with no pain and reports some discomfort and sensitivity around her scar. She has been compliant with brace.  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     Additional Observation Details  Incision is well healed, non-tender to touch or palpation    Tenderness     Right Wrist/Hand   No tenderness in the first dorsal compartment, fourth dorsal compartment, CMC joint, TFCC and radial styloid process.     Additional Tenderness " Details  No tenderness throughout wrist    Neurological Testing     Additional Neurological Details  Patient denies N/T in digits    Active Range of Motion     Left Elbow   Forearm supination: 65 degrees   Forearm pronation: 79 degrees     Right Elbow   Forearm supination: 69 degrees   Forearm pronation: 79 degrees     Left Wrist   Wrist flexion: 35 (23 with digits flexed) degrees   Wrist extension: 30 (28 with digits extended) degrees   Radial deviation: 16 degrees   Ulnar deviation: 15 degrees     Right Wrist   Wrist flexion: 69 degrees   Wrist extension: 71 degrees   Radial deviation: 36 degrees   Ulnar deviation: 18 degrees     Left Thumb   Flexion     MP: 50 degrees    DIP: 50 degrees  Palmar Abduction     CMC: 36 degrees  Radial abduction    CMC: 32 degrees  Kapandji score: 9 degrees      Right Thumb   Flexion     MP: 60    DIP: 70  Palmar Abduction    CMC: 39  Radial Abduction    CMC: 36    Left Digits    Flexion   Index     MCP: 75    PIP: 90    DIP: 55  Middle     MCP: 85    PIP: 95    DIP: 55  Ring     MCP: 85    PIP: 95    DIP: 55  Little     MCP: 85    PIP: 90    DIP: 70  Index: 220 degrees  Middle: 235 degrees  Rin degrees  Small: 245 degrees    Tests     Left Wrist/Hand   Positive extrinsic extensor tightness and oblique retinacular ligament tightness.   Negative Bunnel-Littler, extrinsic flexor tightness and intrinsic muscle tightness.     Additional Tests Details  Mild ORL tightness LMF    Swelling     Left Wrist/Hand   Circumference wrist: 17 cm    Right Wrist/Hand   Circumference wrist: 15.6 cm             Precautions: L distal radius ORIF DOS 4/10/25  Access Code: [email or web address]  POC expires Unit limit Auth  expiration date PT/OT + Visit Limit?   25  pending BOMN                 Visit/Unit Tracking  AUTH Status:  Date  IE              pending Used 1               Remaining                     Manuals  IE        Scar massage Demo for HEP        Edema control                          "  Neuro Re-Ed                                                                        Ther Ex         Wrist A/AAROM Ext/flex x20  RD/UD x20        FA A/AAROM P/S x20        TGEs x10        Thumb AROM Opp x20        Digit A/AAROM Comp flexion self PROM 10 x 5\"                                   Ther Activity         Towel scrunch         FMC, translation         HEP                           Modalities         MHP 5'                             "

## 2025-05-09 ENCOUNTER — OFFICE VISIT (OUTPATIENT)
Dept: OCCUPATIONAL THERAPY | Facility: CLINIC | Age: 80
End: 2025-05-09
Payer: COMMERCIAL

## 2025-05-09 DIAGNOSIS — S52.502A CLOSED FRACTURE OF DISTAL END OF LEFT RADIUS, UNSPECIFIED FRACTURE MORPHOLOGY, INITIAL ENCOUNTER: Primary | ICD-10-CM

## 2025-05-09 PROCEDURE — 97110 THERAPEUTIC EXERCISES: CPT

## 2025-05-09 PROCEDURE — 97530 THERAPEUTIC ACTIVITIES: CPT

## 2025-05-09 PROCEDURE — 97140 MANUAL THERAPY 1/> REGIONS: CPT

## 2025-05-09 NOTE — PROGRESS NOTES
"Daily Note     Today's date: 2025  Patient name: Larissa Powell  : 1945  MRN: [id number]  Referring provider: Lazaro Paez PA-C  Dx:   Encounter Diagnosis     ICD-10-CM    1. Closed fracture of distal end of left radius, unspecified fracture morphology, initial encounter  S52.502A           Start Time: 1115  Stop Time: 1200  Total time in clinic (min): 45 minutes    Subjective: Patient reports she has been compliant with HEP and reports improvement in ROM      Objective: See treatment diary below  Left Wrist   Wrist flexion: 37 degrees (+2)  Wrist extension: 43 degrees (+13)      Assessment: Tolerated treatment well. Patient exhibited good technique with therapeutic exercises and would benefit from continued OT. Patient demonstrating good improvements in AROM. Initiated low grade joint mobilization and IASTM to wrist extensors to decrease extrinsic tightness.       Plan: Continue per plan of care.      Precautions: L distal radius ORIF DOS 4/10/25  Access Code: [email or web address]  POC expires Unit limit Auth  expiration date PT/OT + Visit Limit?   7/14/25  10/31 BOMN                 Visit/Unit Tracking  AUTH Status:  Date  IE              9 thru 10/31 Used 1 2              Remaining                     Manuals  IE        Scar massage Demo for HEP 7'       Edema control  6' MEM       Jt mobs wrist (distraction only)  G1 15 x 5\"       IASTM  Extensors 5'       Neuro Re-Ed                                                                        Ther Ex         Wrist A/AAROM Ext/flex x20  RD/UD x20 Ball rolls 10 x 5\" ext, flex    Maze x5       FA A/AAROM P/S x20        TGEs x10        Thumb AROM Opp x20        Digit A/AAROM Comp flexion self PROM 10 x 5\"                                   Ther Activity         Towel scrunch         FMC, translation  Beads 1 set       HEP                           Modalities         MHP 5' 5'                        "

## 2025-05-12 ENCOUNTER — OFFICE VISIT (OUTPATIENT)
Dept: OCCUPATIONAL THERAPY | Facility: CLINIC | Age: 80
End: 2025-05-12
Payer: COMMERCIAL

## 2025-05-12 DIAGNOSIS — S52.502A CLOSED FRACTURE OF DISTAL END OF LEFT RADIUS, UNSPECIFIED FRACTURE MORPHOLOGY, INITIAL ENCOUNTER: Primary | ICD-10-CM

## 2025-05-12 PROCEDURE — 97110 THERAPEUTIC EXERCISES: CPT

## 2025-05-12 PROCEDURE — 97530 THERAPEUTIC ACTIVITIES: CPT

## 2025-05-12 PROCEDURE — 97140 MANUAL THERAPY 1/> REGIONS: CPT

## 2025-05-12 NOTE — PROGRESS NOTES
"Daily Note     Today's date: 2025  Patient name: Larissa Powell  : 1945  MRN: [id number]  Referring provider: Lazaro Paez PA-C  Dx:   Encounter Diagnosis     ICD-10-CM    1. Closed fracture of distal end of left radius, unspecified fracture morphology, initial encounter  S52.502A           Start Time: 1730  Stop Time: 1815  Total time in clinic (min): 45 minutes    Subjective: Patient reports no soreness or issues after last session. She continues to perform HEP.      Objective: See treatment diary below      Assessment: Tolerated treatment well. Patient exhibited good technique with therapeutic exercises and would benefit from continued OT. Good tolerance of TE with no pain. Continues to progress ROM with HEP.      Plan: Continue per plan of care.      Precautions: L distal radius ORIF DOS 4/10/25  Access Code: [email or web address]  POC expires Unit limit Auth  expiration date PT/OT + Visit Limit?   7/14/25  10/31 BOMN                 Visit/Unit Tracking  AUTH Status:  Date  IE             9 thru 10/31 Used 1 2 3             Remaining                     Manuals  IE       Scar massage Demo for HEP 7' 5'      Edema control  6' MEM       Jt mobs wrist (distraction only)  G1 15 x 5\" G1 15 x 5\"      IASTM  Extensors 5' Extensors and flexors 8'      Neuro Re-Ed                                                                        Ther Ex         Wrist A/AAROM Ext/flex x20  RD/UD x20 Ball rolls 10 x 5\" ext, flex    Maze x5 Ball rolls 10 x 5\" ext, flex    Maze x5      FA A/AAROM P/S x20  Cones p/s x4 sets      TGEs x10        Thumb AROM Opp x20        Digit A/AAROM Comp flexion self PROM 10 x 5\"                                   Ther Activity         Towel scrunch   x15      FMC, translation  Beads 1 set Buttons 1 set      HEP                           Modalities         MHP 5' 5' 5'                         "

## 2025-05-15 ENCOUNTER — OFFICE VISIT (OUTPATIENT)
Dept: OCCUPATIONAL THERAPY | Facility: CLINIC | Age: 80
End: 2025-05-15
Payer: COMMERCIAL

## 2025-05-15 DIAGNOSIS — S52.502A CLOSED FRACTURE OF DISTAL END OF LEFT RADIUS, UNSPECIFIED FRACTURE MORPHOLOGY, INITIAL ENCOUNTER: Primary | ICD-10-CM

## 2025-05-15 PROCEDURE — 97110 THERAPEUTIC EXERCISES: CPT

## 2025-05-15 PROCEDURE — 97530 THERAPEUTIC ACTIVITIES: CPT

## 2025-05-15 PROCEDURE — 97140 MANUAL THERAPY 1/> REGIONS: CPT

## 2025-05-15 NOTE — PROGRESS NOTES
"Daily Note     Today's date: 5/15/2025  Patient name: Larissa Powell  : 1945  MRN: [id number]  Referring provider: Lazaro Paez PA-C  Dx:   Encounter Diagnosis     ICD-10-CM    1. Closed fracture of distal end of left radius, unspecified fracture morphology, initial encounter  S52.502A           Start Time: 0850  Stop Time: 0935  Total time in clinic (min): 45 minutes    Subjective: Patient notes continued improvement in ROM and strength      Objective: See treatment diary below  Left Wrist   Wrist flexion: 47 degrees (+10)  Wrist extension: 50 degrees (+7)      Assessment: Tolerated treatment well. Patient exhibited good technique with therapeutic exercises and would benefit from continued OT. Initiated PROM and AA place and holds for wrist today. Patient tolerated well with some soreness at end range passive extension. She was issued passive stretches for HEP and was instructed to perform within pain tolerance. Good improvements noted in wrist flexion and extension ROM.      Plan: Continue per plan of care.      Precautions: L distal radius ORIF DOS 4/10/25  Access Code: [email or web address]  POC expires Unit limit Auth  expiration date PT/OT + Visit Limit?   7/14/25  10/31 BOMN                 Visit/Unit Tracking  AUTH Status:  Date  IE 5/9 5/12 5/15           9 thru 10/31 Used 1 2 3 4            Remaining                     Manuals  IE 5/9 5/12 5/15     Scar massage Demo for HEP 7' 5' 5'     Edema control  6' MEM       Jt mobs wrist (distraction only)  G1 15 x 5\" G1 15 x 5\" G1 15 x 5\"     IASTM  Extensors 5' Extensors and flexors 8'      PROM wrist    8' ext, flex     Neuro Re-Ed                                                                        Ther Ex         Wrist A/AAROM Ext/flex x20  RD/UD x20 Ball rolls 10 x 5\" ext, flex    Maze x5 Ball rolls 10 x 5\" ext, flex    Maze x5 Wrist AROM all planes with pvc x20    Maze x6    AA place and holds 5 x 3\" flex, ext     FA A/AAROM P/S x20  Cones p/s x4 sets    " "  TGEs x10        Thumb AROM Opp x20        Digit A/AAROM Comp flexion self PROM 10 x 5\"        Passive wrist stretches    10 x 5\" flex, 10 x 5\" ext                       Ther Activity         Towel scrunch   x15 x15     FMC, translation  Beads 1 set Buttons 1 set Buttons 1 set     HEP                           Modalities         MHP 5' 5' 5' 5'                          "

## 2025-05-19 ENCOUNTER — OFFICE VISIT (OUTPATIENT)
Dept: OCCUPATIONAL THERAPY | Facility: CLINIC | Age: 80
End: 2025-05-19
Payer: COMMERCIAL

## 2025-05-19 DIAGNOSIS — S52.502A CLOSED FRACTURE OF DISTAL END OF LEFT RADIUS, UNSPECIFIED FRACTURE MORPHOLOGY, INITIAL ENCOUNTER: Primary | ICD-10-CM

## 2025-05-19 PROCEDURE — 97530 THERAPEUTIC ACTIVITIES: CPT

## 2025-05-19 PROCEDURE — 97140 MANUAL THERAPY 1/> REGIONS: CPT

## 2025-05-19 PROCEDURE — 97110 THERAPEUTIC EXERCISES: CPT

## 2025-05-19 NOTE — PROGRESS NOTES
"Daily Note     Today's date: 2025  Patient name: Larissa Powell  : 1945  MRN: [id number]  Referring provider: Lazaro Paez PA-C  Dx:   Encounter Diagnosis     ICD-10-CM    1. Closed fracture of distal end of left radius, unspecified fracture morphology, initial encounter  S52.502A           Start Time: 1430  Stop Time: 1515  Total time in clinic (min): 45 minutes    Subjective: Patient reports she has been compliant with passive wrist stretches and continues to notice improvement in her function.      Objective: See treatment diary below  Wrist flexion: 52 degrees (+5)  Wrist extension: 53 degrees (+3)      Assessment: Tolerated treatment well. Patient exhibited good technique with therapeutic exercises and would benefit from continued OT. Patient continues to respond well to passive stretching and demonstrates improvements in wrist flexion and extension AROM.      Plan: Continue per plan of care.      Precautions: L distal radius ORIF DOS 4/10/25  Access Code: [email or web address]  POC expires Unit limit Auth  expiration date PT/OT + Visit Limit?   7/14/25  10/31 BOMN                 Visit/Unit Tracking  AUTH Status:  Date  IE 5/9 5/12 5/15 5/19          9 thru 10/31 Used 1 2 3 4 5           Remaining                     Manuals  IE 5/9 5/12 5/15 5/19    Scar massage Demo for HEP 7' 5' 5' 3'    Edema control  6' MEM       Jt mobs wrist (distraction only)  G1 15 x 5\" G1 15 x 5\" G1 15 x 5\" G1 15 x 5\"    IASTM  Extensors 5' Extensors and flexors 8'      PROM wrist    8' ext, flex 8' ext, flex    Neuro Re-Ed                                                                        Ther Ex         Wrist A/AAROM Ext/flex x20  RD/UD x20 Ball rolls 10 x 5\" ext, flex    Maze x5 Ball rolls 10 x 5\" ext, flex    Maze x5 Wrist AROM all planes with pvc x20    Maze x6    AA place and holds 5 x 3\" flex, ext Wrist AROM all planes with PVC x30    AA place and holds 5 x 3\" flex, ext    Maze x6    AA ball rolls 10 x 5\"    FA " "A/AAROM P/S x20  Cones p/s x4 sets  Cones p/s x4 sets    TGEs x10        Thumb AROM Opp x20        Digit A/AAROM Comp flexion self PROM 10 x 5\"        Passive wrist stretches    10 x 5\" flex, 10 x 5\" ext                       Ther Activity         Towel scrunch   x15 x15     FMC, translation  Beads 1 set Buttons 1 set Buttons 1 set Key pegs 1 set    HEP                           Modalities         MHP 5' 5' 5' 5' 5'                           "

## 2025-05-22 ENCOUNTER — APPOINTMENT (OUTPATIENT)
Dept: OCCUPATIONAL THERAPY | Facility: CLINIC | Age: 80
End: 2025-05-22
Payer: COMMERCIAL

## 2025-05-27 ENCOUNTER — OFFICE VISIT (OUTPATIENT)
Dept: OCCUPATIONAL THERAPY | Facility: CLINIC | Age: 80
End: 2025-05-27
Payer: COMMERCIAL

## 2025-05-27 DIAGNOSIS — S52.502A CLOSED FRACTURE OF DISTAL END OF LEFT RADIUS, UNSPECIFIED FRACTURE MORPHOLOGY, INITIAL ENCOUNTER: Primary | ICD-10-CM

## 2025-05-27 PROCEDURE — 97140 MANUAL THERAPY 1/> REGIONS: CPT

## 2025-05-27 PROCEDURE — 97110 THERAPEUTIC EXERCISES: CPT

## 2025-05-27 NOTE — PROGRESS NOTES
"Daily Note     Today's date: 2025  Patient name: Larissa Powell  : 1945  MRN: [id number]  Referring provider: Lazaro Paez PA-C  Dx:   Encounter Diagnosis     ICD-10-CM    1. Closed fracture of distal end of left radius, unspecified fracture morphology, initial encounter  S52.502A           Start Time: 1530  Stop Time: 1618  Total time in clinic (min): 48 minutes    Subjective: Patient reports her wrist has been feeling well and she has had no issues.      Objective: See treatment diary below  Billable time 15:55-16:18      Assessment: Tolerated treatment well. Patient exhibited good technique with therapeutic exercises and would benefit from continued OT. Initiated wrist, , pinch, and FA strengthening, weight bearing, and proprioceptive exercises today. Patient tolerated all TE well with no increase in pain or soreness.      Plan: Continue per plan of care.      Precautions: L distal radius ORIF DOS 4/10/25  Access Code: [email or web address]  POC expires Unit limit Auth  expiration date PT/OT + Visit Limit?   7/14/25  10/31 BOMN                 Visit/Unit Tracking  AUTH Status:  Date  IE 5/9 5/12 5/15 5/19 5/27         9 thru 10/31 Used 1 2 3 4 5 6          Remaining                     Manuals  IE 5/9 5/12 5/15 5/19 5/27   Scar massage Demo for HEP 7' 5' 5' 3'    Edema control  6' MEM       Jt mobs wrist (distraction only)  G1 15 x 5\" G1 15 x 5\" G1 15 x 5\" G1 15 x 5\" G3 flex, ext   IASTM  Extensors 5' Extensors and flexors 8'      PROM wrist    8' ext, flex 8' ext, flex 8' ext, flex   Neuro Re-Ed         Weight bearing      Towel scrub 5'   Proprioception      R MB on PW x30 circles                                                Ther Ex         Wrist A/AAROM Ext/flex x20  RD/UD x20 Ball rolls 10 x 5\" ext, flex    Maze x5 Ball rolls 10 x 5\" ext, flex    Maze x5 Wrist AROM all planes with pvc x20    Maze x6    AA place and holds 5 x 3\" flex, ext Wrist AROM all planes with PVC x30    AA place and holds 5 x " "3\" flex, ext    Maze x6    AA ball rolls 10 x 5\"    FA A/AAROM P/S x20  Cones p/s x4 sets  Cones p/s x4 sets    TGEs x10        Thumb AROM Opp x20        Digit A/AAROM Comp flexion self PROM 10 x 5\"        Passive wrist stretches    10 x 5\" flex, 10 x 5\" ext      strength      RPW x30   Pinch strength      G G CP on PW x2   Wrist strength      RFB WE, WF x30 ea  2# x20 ext, flex rd   FA strength      RFB up/down x30 ea  2# FA bar x20 p/s   Digit ext strength      G digi-ciser x30   Ther Activity         Towel scrunch   x15 x15     FMC, translation  Beads 1 set Buttons 1 set Buttons 1 set Key pegs 1 set    HEP                           Modalities         MHP 5' 5' 5' 5' 5' 5'                            "

## 2025-05-29 ENCOUNTER — OFFICE VISIT (OUTPATIENT)
Dept: OCCUPATIONAL THERAPY | Facility: CLINIC | Age: 80
End: 2025-05-29
Payer: COMMERCIAL

## 2025-05-29 DIAGNOSIS — S52.502A CLOSED FRACTURE OF DISTAL END OF LEFT RADIUS, UNSPECIFIED FRACTURE MORPHOLOGY, INITIAL ENCOUNTER: Primary | ICD-10-CM

## 2025-05-29 PROCEDURE — 97110 THERAPEUTIC EXERCISES: CPT

## 2025-05-29 PROCEDURE — 97140 MANUAL THERAPY 1/> REGIONS: CPT

## 2025-05-29 NOTE — PROGRESS NOTES
"Daily Note     Today's date: 2025  Patient name: Larissa Powell  : 1945  MRN: [id number]  Referring provider: Lazaro Paez PA-C  Dx:   Encounter Diagnosis     ICD-10-CM    1. Closed fracture of distal end of left radius, unspecified fracture morphology, initial encounter  S52.502A           Start Time: 1010  Stop Time: 1055  Total time in clinic (min): 45 minutes    Subjective: Patient reports she had no soreness after strengthening exercises last visit      Objective: See treatment diary below      Assessment: Tolerated treatment well. Patient exhibited good technique with therapeutic exercises and would benefit from continued OT. Issued theraputty for  and pinch strengthening for HEP. Patient performed exercises in clinic with controlled pain.      Plan: Continue per plan of care.      Precautions: L distal radius ORIF DOS 4/10/25  Access Code: [email or web address]  POC expires Unit limit Auth  expiration date PT/OT + Visit Limit?   7/14/25  10/31 BOMN                 Visit/Unit Tracking  AUTH Status:  Date  IE 5/9 5/12 5/15 5/19 5/27 5/29        9 thru 10/31 Used 1 2 3 4 5 6 7         Remaining                     Manuals 5/29 5/9 5/12 5/15 5/19 5/27   Scar massage 3' 7' 5' 5' 3'    Edema control  6' MEM       Jt mobs wrist G3-4 flex G1 15 x 5\" G1 15 x 5\" G1 15 x 5\" G1 15 x 5\" G3 flex, ext   IASTM 3' extensors Extensors 5' Extensors and flexors 8'      PROM wrist 8' flex   8' ext, flex 8' ext, flex 8' ext, flex   Neuro Re-Ed         Weight bearing YTP table x20     Towel scrub 5'   Proprioception      R MB on PW x30 circles                                                Ther Ex         Wrist A/AAROM  Ball rolls 10 x 5\" ext, flex    Maze x5 Ball rolls 10 x 5\" ext, flex    Maze x5 Wrist AROM all planes with pvc x20    Maze x6    AA place and holds 5 x 3\" flex, ext Wrist AROM all planes with PVC x30    AA place and holds 5 x 3\" flex, ext    Maze x6    AA ball rolls 10 x 5\"    FA A/AAROM   Cones p/s x4 sets  " "Cones p/s x4 sets    TGEs         Thumb AROM         Digit A/AAROM         Passive wrist stretches    10 x 5\" flex, 10 x 5\" ext      strength YTP x10     RPW x30   Pinch strength YTP x5     G G CP on PW x2   Wrist strength RFB WE, WF x30  2# x30 ext, flex     RFB WE, WF x30 ea  2# x20 ext, flex rd   FA strength RFB pal m up, down x30 ea  2# FA bar x30     RFB up/down x30 ea  2# FA bar x20 p/s   Digit ext strength      G digi-ciser x30   Ther Activity         Towel scrunch   x15 x15     FMC, translation  Beads 1 set Buttons 1 set Buttons 1 set Key pegs 1 set    HEP                           Modalities         MHP 5' 5' 5' 5' 5' 5'                              "

## 2025-06-16 ENCOUNTER — EVALUATION (OUTPATIENT)
Dept: OCCUPATIONAL THERAPY | Facility: CLINIC | Age: 80
End: 2025-06-16
Payer: COMMERCIAL

## 2025-06-16 DIAGNOSIS — S52.502A CLOSED FRACTURE OF DISTAL END OF LEFT RADIUS, UNSPECIFIED FRACTURE MORPHOLOGY, INITIAL ENCOUNTER: Primary | ICD-10-CM

## 2025-06-16 PROCEDURE — 97110 THERAPEUTIC EXERCISES: CPT

## 2025-06-16 NOTE — PROGRESS NOTES
OT Discharge    Today's date: 2025  Patient name: Larissa Powell  : 1945  MRN: [id number]  Referring provider: Lazaro Paez PA-C  Dx:   Encounter Diagnosis     ICD-10-CM    1. Closed fracture of distal end of left radius, unspecified fracture morphology, initial encounter  S52.502A           Start Time: 0945  Stop Time: 1025  Total time in clinic (min): 40 minutes    Assessment  Impairments: abnormal or restricted ROM, impaired physical strength, weight-bearing intolerance, activity limitations and endurance  Symptom irritability: low    Assessment details: Larissa Powell is a 79 y.o., Right HD female referred to hand therapy for a L distal radius ORIF.  Onset of injury 25 due to a FOOSH while doing yard work.  Patient presents 25 with impaired ROM, strength, and coordination of the L hand and wrist.  Deficits also noted in pain and functional use of the left UE. Patient has an incision/wound on the volar wrist and FA that is healing well with no signs or symptoms of infection. She was provided with an initial HEP focused on AROM of wrist, thumb, digits, and FA as well as passive stretching of digits into composite flexionPatient is a good candidate for OT services to decrease pain and edema and restore ROM, strength, coordination, and function for a return to independence in daily tasks.     25: Patient seen 8 times in OT and has demonstrated improvements in ROM, strength, and edema. Patient has minimal difficulty or pain with any activity and now demonstrates wrist, FA ROM WFLs. Mild limitation in  strength but this continues to improve through HEP. She will be transitioned to HEP today. She was provided with an updated HEP focused on  and pinch strengthening, weight bearing, and wrist and FA PREs.  Understanding of Dx/Px/POC: excellent     Prognosis: good    Goals  STGs (4 weeks)  Patient will be independent in implementing HEP prescribed by therapist MET  Patient will demonstrate  5-10 degree improvement in WALKER of the digits MET  Patient will demonstrate active wrist extension/flexion to 40/40 MET  Promote proper scar healing to prevent scar adhesion and infection MET  Decrease edema by 50% at wrist as evident by circumferential measurements.MET  LTGs (10 weeks)  Patient will demonstrate independence in a HEP to maintain ROM, strength, and function at discharge MET  Patient will demonstrate  strength within 20% of R hand for improved use of L hand in ADLs PROGRESSING  Patient will demonstrate WALKER of the digits to WFLs degrees to be independent in self care tasks MET  Patient will demonstrate AROM of the wrist and forearm WFL to be independent in self care tasks MET  Patient will demonstrate 5/5 muscle strength in the wrist and forearm to be MI for meal prep NEARLY MET  Patient will achieve goals as demonstrated by FOTO results MET  Patient will demonstrate 90% resolution of edema PROGRESSING    Plan  Patient would benefit from: home program    Planned therapy interventions: home exercise program    Plan of Care beginning date: 5/5/2025  Plan of Care expiration date: 7/14/2025  Treatment plan discussed with: patient  Plan details: Patient discharged to HEP today. She was encouraged to contact office with any questions or concerns.        Subjective Evaluation    History of Present Illness  Date of onset: 4/1/2025  Date of surgery: 4/10/2025  Mechanism of injury: surgery and trauma  Mechanism of injury: Larissa Powell is a 79 y.o. female referred to OT s/p L distal radius ORIF on 4/10/25. Patient reports that she was doing yard work on 4/1/25. She presented to [facility] where she was placed in a cast. She followed up with orthopedics and she elected for surgery due to nature and displacement of fracture.   She presents today with no pain and reports some discomfort and sensitivity around her scar. She has been compliant with brace.    6/16/25: Patient reports her wrist has been feeling good. She  "has had minimal difficulty with activities. She has been compliant with HEP.  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 Negative for edema.     Additional Observation Details  Incision is well healed, non-tender to touch or palpation. Minimal observable edema    Tenderness     Right Wrist/Hand   No tenderness in the first dorsal compartment, fourth dorsal compartment, CMC joint, TFCC and radial styloid process.     Additional Tenderness Details  No tenderness throughout wrist    Neurological Testing     Additional Neurological Details  Patient denies N/T in digits    Active Range of Motion     Left Elbow   Forearm supination: 71 degrees   Forearm pronation: 79 degrees     Right Elbow   Forearm supination: 69 degrees   Forearm pronation: 79 degrees     Left Wrist   Wrist flexion: 57 (32 with digits flexed (+9)) degrees   Wrist extension: 65 (63 with digits extended (+35)) degrees   Radial deviation: 16 degrees   Ulnar deviation: 38 degrees     Right Wrist   Wrist flexion: 69 degrees   Wrist extension: 71 degrees   Radial deviation: 36 degrees   Ulnar deviation: 18 degrees     Left Thumb   Flexion     MP: 60 degrees    DIP: 70 degrees  Palmar Abduction     CMC: 45 degrees  Radial abduction    CMC: 36 degrees  Kapandji score: 9 degrees    Opposition: 25: MP improved 10 degrees, IP improved 20 degrees, PA improved 9 degrees, RA improved 4 degrees    Right Thumb   Flexion     MP: 60    DIP: 70  Palmar Abduction    CMC: " "39  Radial Abduction    CMC: 36    Left Digits    Flexion   Index     MCP: 75    PIP: 90    DIP: 55  Middle     MCP: 85    PIP: 95    DIP: 55  Ring     MCP: 85    PIP: 95    DIP: 55  Little     MCP: 85    PIP: 90    DIP: 70  Index: 220 degrees  Middle: 235 degrees  Rin degrees  Small: 245 degrees    Additional Active Range of Motion Details  25: sup improved 6 degrees  25: flex improved 22 degrees, ext improved 35 degrees, UD improved 23 degrees  25: full composite fist    Strength/Myotome Testing     Left Elbow   Forearm supination: 4+  Forearm pronation: 5    Left Wrist/Hand   Wrist extension: 5  Wrist flexion: 4+  Radial deviation: 5  Ulnar deviation: 5     (2nd hand position)     Trial 1: 35    Thumb Strength  Key/Lateral Pinch     Trial 1: 10  Palmar/Three-Point Pinch     Trial 1: 9    Right Wrist/Hand      (2nd hand position)     Trial 1: 53    Thumb Strength   Key/Lateral Pinch     Trial 1: 15  Palmar/Three-Point Pinch     Trial 1: 12    Tests     Left Wrist/Hand   Positive extrinsic extensor tightness.   Negative Bunnel-Littler, extrinsic flexor tightness, intrinsic muscle tightness and oblique retinacular ligament tightness.     Swelling     Left Wrist/Hand   Circumference wrist: 16.3 cm    Additional Swelling Details  25: edema decreased 0.7 cm    Right Wrist/Hand   Circumference wrist: 15.6 cm             Precautions: L distal radius ORIF DOS 4/10/25  Access Code: [email or web address]  POC expires Unit limit Auth  expiration date PT/OT + Visit Limit?   7/14/25   10/31 BOMN                          Visit/Unit Tracking  AUTH Status:  Date  IE 5/9 5/12 5/15 5/19 5/27 5/29  6/16 RE           9 thru 10/31 Used 1 2 3 4 5 6 7  8             Remaining                                  Manuals  RE 5/12 5/15 5/19 5/27   Scar massage 3'  5' 5' 3'     Edema control              Jt mobs wrist G3-4 flex  G1 15 x 5\" G1 15 x 5\" G1 15 x 5\" G3 flex, ext   IASTM 3' extensors  Extensors and " "flexors 8'         PROM wrist 8' flex     8' ext, flex 8' ext, flex 8' ext, flex   Neuro Re-Ed               Weight bearing YTP table x20  YTP table x20       Towel scrub 5'   Proprioception           R MB on PW x30 circles                                                                                   Ther Ex               Wrist A/AAROM    Ball rolls 10 x 5\" ext, flex     Maze x5 Wrist AROM all planes with pvc x20     Maze x6     AA place and holds 5 x 3\" flex, ext Wrist AROM all planes with PVC x30     AA place and holds 5 x 3\" flex, ext     Maze x6     AA ball rolls 10 x 5\"     FA A/AAROM     Cones p/s x4 sets   Cones p/s x4 sets     TGEs               Thumb AROM               Digit A/AAROM               Passive wrist stretches       10 x 5\" flex, 10 x 5\" ext        strength YTP x10         RPW x30   Pinch strength YTP x5         G G CP on PW x2   Wrist strength RFB WE, WF x30  2# x30 ext, flex  GTB WE, WF x30 ea       RFB WE, WF x30 ea  2# x20 ext, flex rd   FA strength RFB pal m up, down x30 ea  2# FA bar x30  FA bar 3# x30 p/s       RFB up/down x30 ea  2# FA bar x20 p/s   Digit ext strength           G digi-ciser x30   Re-Eval  BS performed 25'       Ther Activity               Towel scrunch     x15 x15       FMC, translation    Buttons 1 set Buttons 1 set Key pegs 1 set     HEP                                               Modalities               MHP 5' With subjective RE 5' 5' 5' 5'                             "

## 2025-06-16 NOTE — LETTER
2025    Lazaro Paez PA-C  [address] [facility] [address]    Patient: Larissa Powell   YOB: 1945   Date of Visit: 2025     Encounter Diagnosis     ICD-10-CM    1. Closed fracture of distal end of left radius, unspecified fracture morphology, initial encounter  S52.502A           Dear Dr. Lazaro Paez PA-C:    Thank you for your recent referral of Larissa Powell. Please review the attached evaluation summary from Iris's recent visit.     Please verify that you agree with the plan of care by signing the attached order.     If you have any questions or concerns, please do not hesitate to call.     I sincerely appreciate the opportunity to share in the care of one of your patients and hope to have another opportunity to work with you in the near future.     Sincerely,    Jhoan Stearns, OT      Referring Provider:     I certify that I have read the below Plan of Care and certify the need for these services furnished under this plan of treatment while under my care.                    Lazaro Paez PA-C  [address] [facility] [address]  Via Fax: [phone]        OT Discharge    Today's date: 2025  Patient name: Larissa Powell  : 1945  MRN: [id number]  Referring provider: Lazaro Paez PA-C  Dx:   Encounter Diagnosis     ICD-10-CM    1. Closed fracture of distal end of left radius, unspecified fracture morphology, initial encounter  S52.502A           Start Time: 0945  Stop Time: 1025  Total time in clinic (min): 40 minutes    Assessment  Impairments: abnormal or restricted ROM, impaired physical strength, weight-bearing intolerance, activity limitations and endurance  Symptom irritability: low    Assessment details: Larissa Powell is a 79 y.o., Right HD female referred to hand therapy for a L distal radius ORIF.  Onset of injury 25 due to a FOOSH while doing yard work.  Patient presents 25 with impaired ROM, strength, and coordination of  the L hand and wrist.  Deficits also noted in pain and functional use of the left UE. Patient has an incision/wound on the volar wrist and FA that is healing well with no signs or symptoms of infection. She was provided with an initial HEP focused on AROM of wrist, thumb, digits, and FA as well as passive stretching of digits into composite flexionPatient is a good candidate for OT services to decrease pain and edema and restore ROM, strength, coordination, and function for a return to independence in daily tasks.     6/16/25: Patient seen 8 times in OT and has demonstrated improvements in ROM, strength, and edema. Patient has minimal difficulty or pain with any activity and now demonstrates wrist, FA ROM WFLs. Mild limitation in  strength but this continues to improve through HEP. She will be transitioned to HEP today. She was provided with an updated HEP focused on  and pinch strengthening, weight bearing, and wrist and FA PREs.  Understanding of Dx/Px/POC: excellent     Prognosis: good    Goals  STGs (4 weeks)  Patient will be independent in implementing HEP prescribed by therapist MET  Patient will demonstrate 5-10 degree improvement in WALKER of the digits MET  Patient will demonstrate active wrist extension/flexion to 40/40 MET  Promote proper scar healing to prevent scar adhesion and infection MET  Decrease edema by 50% at wrist as evident by circumferential measurements.MET  LTGs (10 weeks)  Patient will demonstrate independence in a HEP to maintain ROM, strength, and function at discharge MET  Patient will demonstrate  strength within 20% of R hand for improved use of L hand in ADLs PROGRESSING  Patient will demonstrate WALKER of the digits to WFLs degrees to be independent in self care tasks MET  Patient will demonstrate AROM of the wrist and forearm WFL to be independent in self care tasks MET  Patient will demonstrate 5/5 muscle strength in the wrist and forearm to be MI for meal prep NEARLY  "MET  Patient will achieve goals as demonstrated by FOTO results MET  Patient will demonstrate 90% resolution of edema PROGRESSING    Plan  Patient would benefit from: home program    Planned therapy interventions: home exercise program    Plan of Care beginning date: 2025  Plan of Care expiration date: 2025  Treatment plan discussed with: patient  Plan details: Patient discharged to [facility] today. She was encouraged to contact office with any questions or concerns.        Subjective Evaluation    History of Present Illness  Date of onset: 2025  Date of surgery: 4/10/2025  Mechanism of injury: surgery and trauma  Mechanism of injury: Larissa Powell is a 79 y.o. female referred to OT s/p L distal radius ORIF on 4/10/25. Patient reports that she was doing yard work on 25. She presented to [facility] where she was placed in a cast. She followed up with orthopedics and she elected for surgery due to nature and displacement of fracture.   She presents today with no pain and reports some discomfort and sensitivity around her scar. She has been compliant with brace.    25: Patient reports her wrist has been feeling good. She has had minimal difficulty with activities. She has been compliant with [facility].  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 Negative for edema.     Additional Observation Details  Incision is well healed, non-tender to touch or palpation. Minimal observable edema    Tenderness     Right Wrist/Hand "   No tenderness in the first dorsal compartment, fourth dorsal compartment, CMC joint, TFCC and radial styloid process.     Additional Tenderness Details  No tenderness throughout wrist    Neurological Testing     Additional Neurological Details  Patient denies N/T in digits    Active Range of Motion     Left Elbow   Forearm supination: 71 degrees   Forearm pronation: 79 degrees     Right Elbow   Forearm supination: 69 degrees   Forearm pronation: 79 degrees     Left Wrist   Wrist flexion: 57 (32 with digits flexed (+9)) degrees   Wrist extension: 65 (63 with digits extended (+35)) degrees   Radial deviation: 16 degrees   Ulnar deviation: 38 degrees     Right Wrist   Wrist flexion: 69 degrees   Wrist extension: 71 degrees   Radial deviation: 36 degrees   Ulnar deviation: 18 degrees     Left Thumb   Flexion     MP: 60 degrees    DIP: 70 degrees  Palmar Abduction     CMC: 45 degrees  Radial abduction    CMC: 36 degrees  Kapandji score: 9 degrees    Opposition: 25: MP improved 10 degrees, IP improved 20 degrees, PA improved 9 degrees, RA improved 4 degrees    Right Thumb   Flexion     MP: 60    DIP: 70  Palmar Abduction    CMC: 39  Radial Abduction    CMC: 36    Left Digits    Flexion   Index     MCP: 75    PIP: 90    DIP: 55  Middle     MCP: 85    PIP: 95    DIP: 55  Ring     MCP: 85    PIP: 95    DIP: 55  Little     MCP: 85    PIP: 90    DIP: 70  Index: 220 degrees  Middle: 235 degrees  Rin degrees  Small: 245 degrees    Additional Active Range of Motion Details  25: sup improved 6 degrees  25: flex improved 22 degrees, ext improved 35 degrees, UD improved 23 degrees  25: full composite fist    Strength/Myotome Testing     Left Elbow   Forearm supination: 4+  Forearm pronation: 5    Left Wrist/Hand   Wrist extension: 5  Wrist flexion: 4+  Radial deviation: 5  Ulnar deviation: 5     (2nd hand position)     Trial 1: 35    Thumb Strength  Key/Lateral Pinch     Trial 1:  "10  Palmar/Three-Point Pinch     Trial 1: 9    Right Wrist/Hand      (2nd hand position)     Trial 1: 53    Thumb Strength   Key/Lateral Pinch     Trial 1: 15  Palmar/Three-Point Pinch     Trial 1: 12    Tests     Left Wrist/Hand   Positive extrinsic extensor tightness.   Negative Bunnel-Littler, extrinsic flexor tightness, intrinsic muscle tightness and oblique retinacular ligament tightness.     Swelling     Left Wrist/Hand   Circumference wrist: 16.3 cm    Additional Swelling Details  6/16/25: edema decreased 0.7 cm    Right Wrist/Hand   Circumference wrist: 15.6 cm             Precautions: L distal radius ORIF DOS 4/10/25  Access Code: [email or web address]  POC expires Unit limit Auth  expiration date PT/OT + Visit Limit?   7/14/25   10/31 BOMN                          Visit/Unit Tracking  AUTH Status:  Date 5/5 IE 5/9 5/12 5/15 5/19 5/27 5/29  6/16 RE           9 thru 10/31 Used 1 2 3 4 5 6 7  8             Remaining                                  Manuals 5/29 6/16 RE 5/12 5/15 5/19 5/27   Scar massage 3'  5' 5' 3'     Edema control              Jt mobs wrist G3-4 flex  G1 15 x 5\" G1 15 x 5\" G1 15 x 5\" G3 flex, ext   IASTM 3' extensors  Extensors and flexors 8'         PROM wrist 8' flex     8' ext, flex 8' ext, flex 8' ext, flex   Neuro Re-Ed               Weight bearing YTP table x20  YTP table x20       Towel scrub 5'   Proprioception           R MB on PW x30 circles                                                                                   Ther Ex               Wrist A/AAROM    Ball rolls 10 x 5\" ext, flex     Maze x5 Wrist AROM all planes with pvc x20     Maze x6     AA place and holds 5 x 3\" flex, ext Wrist AROM all planes with PVC x30     AA place and holds 5 x 3\" flex, ext     Maze x6     AA ball rolls 10 x 5\"     FA A/AAROM     Cones p/s x4 sets   Cones p/s x4 sets     TGEs               Thumb AROM               Digit A/AAROM               Passive wrist stretches       10 x 5\" flex, 10 x 5\" ext     "    strength YTP x10         RPW x30   Pinch strength YTP x5         G G CP on PW x2   Wrist strength RFB WE, WF x30  2# x30 ext, flex  GTB WE, WF x30 ea       RFB WE, WF x30 ea  2# x20 ext, flex rd   FA strength RFB pal m up, down x30 ea  2# FA bar x30  FA bar 3# x30 p/s       RFB up/down x30 ea  2# FA bar x20 p/s   Digit ext strength           G digi-ciser x30   Re-Eval  BS performed 25'       Ther Activity               Towel scrunch     x15 x15       FMC, translation    Buttons 1 set Buttons 1 set Key pegs 1 set     HEP                                               Modalities               MHP 5' With subjective RE 5' 5' 5' 5'

## 2025-06-19 ENCOUNTER — APPOINTMENT (OUTPATIENT)
Dept: OCCUPATIONAL THERAPY | Facility: CLINIC | Age: 80
End: 2025-06-19
Payer: COMMERCIAL